# Patient Record
Sex: FEMALE | Race: WHITE | Employment: OTHER | URBAN - METROPOLITAN AREA
[De-identification: names, ages, dates, MRNs, and addresses within clinical notes are randomized per-mention and may not be internally consistent; named-entity substitution may affect disease eponyms.]

---

## 2020-05-22 ENCOUNTER — HOSPITAL ENCOUNTER (INPATIENT)
Age: 85
LOS: 6 days | Discharge: NURSING, REHAB OR LONG-TERM CARE | DRG: 291 | End: 2020-05-28
Attending: EMERGENCY MEDICINE | Admitting: INTERNAL MEDICINE
Payer: MEDICARE

## 2020-05-22 ENCOUNTER — APPOINTMENT (OUTPATIENT)
Dept: GENERAL RADIOLOGY | Age: 85
DRG: 291 | End: 2020-05-22
Payer: MEDICARE

## 2020-05-22 PROBLEM — J96.00 ACUTE RESPIRATORY FAILURE (HCC): Status: ACTIVE | Noted: 2020-05-22

## 2020-05-22 LAB
ANION GAP SERPL CALCULATED.3IONS-SCNC: 14 MMOL/L (ref 3–16)
BACTERIA: ABNORMAL /HPF
BASE EXCESS VENOUS: 0.8 MMOL/L (ref -2–3)
BASOPHILS ABSOLUTE: 0.1 K/UL (ref 0–0.2)
BASOPHILS RELATIVE PERCENT: 0.4 %
BILIRUBIN URINE: ABNORMAL
BLOOD, URINE: NEGATIVE
BUN BLDV-MCNC: 34 MG/DL (ref 7–20)
CALCIUM SERPL-MCNC: 9 MG/DL (ref 8.3–10.6)
CARBOXYHEMOGLOBIN: 1.4 % (ref 0–1.5)
CHLORIDE BLD-SCNC: 94 MMOL/L (ref 99–110)
CLARITY: ABNORMAL
CO2: 26 MMOL/L (ref 21–32)
COLOR: YELLOW
CREAT SERPL-MCNC: 0.8 MG/DL (ref 0.6–1.2)
EKG ATRIAL RATE: 55 BPM
EKG DIAGNOSIS: NORMAL
EKG Q-T INTERVAL: 256 MS
EKG QRS DURATION: 96 MS
EKG QTC CALCULATION (BAZETT): 397 MS
EKG R AXIS: 132 DEGREES
EKG T AXIS: 237 DEGREES
EKG VENTRICULAR RATE: 145 BPM
EOSINOPHILS ABSOLUTE: 0.4 K/UL (ref 0–0.6)
EOSINOPHILS RELATIVE PERCENT: 2.4 %
EPITHELIAL CELLS, UA: ABNORMAL /HPF (ref 0–5)
GFR AFRICAN AMERICAN: >60
GFR NON-AFRICAN AMERICAN: >60
GLUCOSE BLD-MCNC: 177 MG/DL (ref 70–99)
GLUCOSE URINE: NEGATIVE MG/DL
HCO3 VENOUS: 28.3 MMOL/L (ref 24–28)
HCT VFR BLD CALC: 36.9 % (ref 36–48)
HEMOGLOBIN, VEN, REDUCED: 52.9 %
HEMOGLOBIN: 11.8 G/DL (ref 12–16)
KETONES, URINE: ABNORMAL MG/DL
LACTIC ACID: 2.9 MMOL/L (ref 0.4–2)
LEUKOCYTE ESTERASE, URINE: NEGATIVE
LYMPHOCYTES ABSOLUTE: 0.7 K/UL (ref 1–5.1)
LYMPHOCYTES RELATIVE PERCENT: 4.2 %
MAGNESIUM: 2 MG/DL (ref 1.8–2.4)
MCH RBC QN AUTO: 26.8 PG (ref 26–34)
MCHC RBC AUTO-ENTMCNC: 32 G/DL (ref 31–36)
MCV RBC AUTO: 83.9 FL (ref 80–100)
METHEMOGLOBIN VENOUS: 0.5 % (ref 0–1.5)
MICROSCOPIC EXAMINATION: YES
MONOCYTES ABSOLUTE: 0.8 K/UL (ref 0–1.3)
MONOCYTES RELATIVE PERCENT: 4.7 %
MUCUS: ABNORMAL /LPF
NEUTROPHILS ABSOLUTE: 14.2 K/UL (ref 1.7–7.7)
NEUTROPHILS RELATIVE PERCENT: 88.3 %
NITRITE, URINE: NEGATIVE
O2 SAT, VEN: 46 %
PCO2, VEN: 62.3 MMHG (ref 41–51)
PDW BLD-RTO: 15.4 % (ref 12.4–15.4)
PH UA: 6 (ref 5–8)
PH VENOUS: 7.28 (ref 7.35–7.45)
PHOSPHORUS: 3.3 MG/DL (ref 2.5–4.9)
PLATELET # BLD: 221 K/UL (ref 135–450)
PMV BLD AUTO: 8.1 FL (ref 5–10.5)
PO2, VEN: 32.4 MMHG (ref 25–40)
POTASSIUM SERPL-SCNC: 4 MMOL/L (ref 3.5–5.1)
PRO-BNP: 8364 PG/ML (ref 0–449)
PROTEIN UA: 100 MG/DL
RBC # BLD: 4.4 M/UL (ref 4–5.2)
RBC UA: ABNORMAL /HPF (ref 0–4)
SARS-COV-2, PCR: NOT DETECTED
SODIUM BLD-SCNC: 134 MMOL/L (ref 136–145)
SPECIFIC GRAVITY UA: >=1.03 (ref 1–1.03)
TCO2 CALC VENOUS: 30 MMOL/L
TROPONIN: 0.02 NG/ML
URINE TYPE: ABNORMAL
UROBILINOGEN, URINE: 0.2 E.U./DL
WBC # BLD: 16 K/UL (ref 4–11)
WBC UA: ABNORMAL /HPF (ref 0–5)

## 2020-05-22 PROCEDURE — 84484 ASSAY OF TROPONIN QUANT: CPT

## 2020-05-22 PROCEDURE — 6360000002 HC RX W HCPCS: Performed by: INTERNAL MEDICINE

## 2020-05-22 PROCEDURE — 96375 TX/PRO/DX INJ NEW DRUG ADDON: CPT

## 2020-05-22 PROCEDURE — 83880 ASSAY OF NATRIURETIC PEPTIDE: CPT

## 2020-05-22 PROCEDURE — 1200000000 HC SEMI PRIVATE

## 2020-05-22 PROCEDURE — 94761 N-INVAS EAR/PLS OXIMETRY MLT: CPT

## 2020-05-22 PROCEDURE — 84100 ASSAY OF PHOSPHORUS: CPT

## 2020-05-22 PROCEDURE — 2500000003 HC RX 250 WO HCPCS: Performed by: EMERGENCY MEDICINE

## 2020-05-22 PROCEDURE — 87040 BLOOD CULTURE FOR BACTERIA: CPT

## 2020-05-22 PROCEDURE — U0003 INFECTIOUS AGENT DETECTION BY NUCLEIC ACID (DNA OR RNA); SEVERE ACUTE RESPIRATORY SYNDROME CORONAVIRUS 2 (SARS-COV-2) (CORONAVIRUS DISEASE [COVID-19]), AMPLIFIED PROBE TECHNIQUE, MAKING USE OF HIGH THROUGHPUT TECHNOLOGIES AS DESCRIBED BY CMS-2020-01-R: HCPCS

## 2020-05-22 PROCEDURE — 96374 THER/PROPH/DIAG INJ IV PUSH: CPT

## 2020-05-22 PROCEDURE — 6370000000 HC RX 637 (ALT 250 FOR IP): Performed by: EMERGENCY MEDICINE

## 2020-05-22 PROCEDURE — 99285 EMERGENCY DEPT VISIT HI MDM: CPT

## 2020-05-22 PROCEDURE — 2580000003 HC RX 258: Performed by: INTERNAL MEDICINE

## 2020-05-22 PROCEDURE — 6370000000 HC RX 637 (ALT 250 FOR IP): Performed by: INTERNAL MEDICINE

## 2020-05-22 PROCEDURE — 93005 ELECTROCARDIOGRAM TRACING: CPT | Performed by: EMERGENCY MEDICINE

## 2020-05-22 PROCEDURE — 2580000003 HC RX 258: Performed by: EMERGENCY MEDICINE

## 2020-05-22 PROCEDURE — 2580000003 HC RX 258

## 2020-05-22 PROCEDURE — 80048 BASIC METABOLIC PNL TOTAL CA: CPT

## 2020-05-22 PROCEDURE — 83605 ASSAY OF LACTIC ACID: CPT

## 2020-05-22 PROCEDURE — 6360000002 HC RX W HCPCS: Performed by: EMERGENCY MEDICINE

## 2020-05-22 PROCEDURE — 71045 X-RAY EXAM CHEST 1 VIEW: CPT

## 2020-05-22 PROCEDURE — 81001 URINALYSIS AUTO W/SCOPE: CPT

## 2020-05-22 PROCEDURE — 2700000000 HC OXYGEN THERAPY PER DAY

## 2020-05-22 PROCEDURE — 83735 ASSAY OF MAGNESIUM: CPT

## 2020-05-22 PROCEDURE — 82803 BLOOD GASES ANY COMBINATION: CPT

## 2020-05-22 PROCEDURE — 51701 INSERT BLADDER CATHETER: CPT

## 2020-05-22 PROCEDURE — 99999 PR OFFICE/OUTPT VISIT,PROCEDURE ONLY: CPT | Performed by: INTERNAL MEDICINE

## 2020-05-22 PROCEDURE — 85025 COMPLETE CBC W/AUTO DIFF WBC: CPT

## 2020-05-22 PROCEDURE — 94640 AIRWAY INHALATION TREATMENT: CPT

## 2020-05-22 RX ORDER — POLYETHYLENE GLYCOL 3350 17 G/17G
17 POWDER, FOR SOLUTION ORAL DAILY PRN
COMMUNITY

## 2020-05-22 RX ORDER — 0.9 % SODIUM CHLORIDE 0.9 %
500 INTRAVENOUS SOLUTION INTRAVENOUS ONCE
Status: COMPLETED | OUTPATIENT
Start: 2020-05-22 | End: 2020-05-22

## 2020-05-22 RX ORDER — QUETIAPINE FUMARATE 50 MG/1
50 TABLET, FILM COATED ORAL EVERY EVENING
COMMUNITY

## 2020-05-22 RX ORDER — HYDROCHLOROTHIAZIDE 12.5 MG/1
12.5 CAPSULE, GELATIN COATED ORAL EVERY MORNING
Status: DISCONTINUED | OUTPATIENT
Start: 2020-05-22 | End: 2020-05-22

## 2020-05-22 RX ORDER — QUETIAPINE FUMARATE 25 MG/1
50 TABLET, FILM COATED ORAL EVERY EVENING
Status: DISCONTINUED | OUTPATIENT
Start: 2020-05-22 | End: 2020-05-28 | Stop reason: HOSPADM

## 2020-05-22 RX ORDER — IPRATROPIUM BROMIDE AND ALBUTEROL SULFATE 2.5; .5 MG/3ML; MG/3ML
1 SOLUTION RESPIRATORY (INHALATION) EVERY 4 HOURS PRN
COMMUNITY

## 2020-05-22 RX ORDER — RIVASTIGMINE TARTRATE 1.5 MG/1
1.5 CAPSULE ORAL 2 TIMES DAILY
COMMUNITY

## 2020-05-22 RX ORDER — SERTRALINE HYDROCHLORIDE 25 MG/1
25 TABLET, FILM COATED ORAL DAILY
Status: DISCONTINUED | OUTPATIENT
Start: 2020-05-22 | End: 2020-05-28 | Stop reason: HOSPADM

## 2020-05-22 RX ORDER — UREA 10 %
6 LOTION (ML) TOPICAL NIGHTLY PRN
Status: DISCONTINUED | OUTPATIENT
Start: 2020-05-22 | End: 2020-05-24

## 2020-05-22 RX ORDER — DIGOXIN 0.25 MG/ML
250 INJECTION INTRAMUSCULAR; INTRAVENOUS ONCE
Status: COMPLETED | OUTPATIENT
Start: 2020-05-22 | End: 2020-05-22

## 2020-05-22 RX ORDER — PROMETHAZINE HYDROCHLORIDE 25 MG/1
12.5 TABLET ORAL EVERY 6 HOURS PRN
Status: DISCONTINUED | OUTPATIENT
Start: 2020-05-22 | End: 2020-05-28 | Stop reason: HOSPADM

## 2020-05-22 RX ORDER — ACETAMINOPHEN 325 MG/1
650 TABLET ORAL EVERY 6 HOURS PRN
Status: DISCONTINUED | OUTPATIENT
Start: 2020-05-22 | End: 2020-05-28 | Stop reason: HOSPADM

## 2020-05-22 RX ORDER — ACETAMINOPHEN 325 MG/1
650 TABLET ORAL EVERY 6 HOURS PRN
COMMUNITY

## 2020-05-22 RX ORDER — PREDNISONE 1 MG/1
2.5 TABLET ORAL DAILY
Status: DISCONTINUED | OUTPATIENT
Start: 2020-05-22 | End: 2020-05-28 | Stop reason: HOSPADM

## 2020-05-22 RX ORDER — DOCUSATE SODIUM 100 MG/1
100 CAPSULE, LIQUID FILLED ORAL EVERY MORNING
Status: DISCONTINUED | OUTPATIENT
Start: 2020-05-22 | End: 2020-05-28 | Stop reason: HOSPADM

## 2020-05-22 RX ORDER — HYDROCODONE BITARTRATE AND ACETAMINOPHEN 5; 325 MG/1; MG/1
1 TABLET ORAL EVERY 8 HOURS PRN
Status: DISCONTINUED | OUTPATIENT
Start: 2020-05-22 | End: 2020-05-28 | Stop reason: HOSPADM

## 2020-05-22 RX ORDER — POTASSIUM CHLORIDE 600 MG/1
8 TABLET, FILM COATED, EXTENDED RELEASE ORAL 2 TIMES DAILY
COMMUNITY

## 2020-05-22 RX ORDER — LANOLIN ALCOHOL/MO/W.PET/CERES
6 CREAM (GRAM) TOPICAL NIGHTLY PRN
COMMUNITY

## 2020-05-22 RX ORDER — RIVASTIGMINE TARTRATE 1.5 MG/1
1.5 CAPSULE ORAL 2 TIMES DAILY
Status: DISCONTINUED | OUTPATIENT
Start: 2020-05-22 | End: 2020-05-28 | Stop reason: HOSPADM

## 2020-05-22 RX ORDER — FLECAINIDE ACETATE 100 MG/1
100 TABLET ORAL 2 TIMES DAILY
Status: DISCONTINUED | OUTPATIENT
Start: 2020-05-22 | End: 2020-05-28 | Stop reason: HOSPADM

## 2020-05-22 RX ORDER — SODIUM CHLORIDE 9 MG/ML
INJECTION, SOLUTION INTRAVENOUS
Status: COMPLETED
Start: 2020-05-22 | End: 2020-05-22

## 2020-05-22 RX ORDER — DOCUSATE SODIUM 100 MG/1
100 CAPSULE, LIQUID FILLED ORAL EVERY MORNING
COMMUNITY

## 2020-05-22 RX ORDER — HYDROCHLOROTHIAZIDE 12.5 MG/1
12.5 CAPSULE, GELATIN COATED ORAL EVERY MORNING
Status: ON HOLD | COMMUNITY
End: 2020-05-28 | Stop reason: HOSPADM

## 2020-05-22 RX ORDER — SODIUM CHLORIDE 0.9 % (FLUSH) 0.9 %
10 SYRINGE (ML) INJECTION PRN
Status: DISCONTINUED | OUTPATIENT
Start: 2020-05-22 | End: 2020-05-28 | Stop reason: HOSPADM

## 2020-05-22 RX ORDER — BRIMONIDINE TARTRATE 2 MG/ML
1 SOLUTION/ DROPS OPHTHALMIC 2 TIMES DAILY
Status: DISCONTINUED | OUTPATIENT
Start: 2020-05-22 | End: 2020-05-28 | Stop reason: HOSPADM

## 2020-05-22 RX ORDER — ONDANSETRON 4 MG/1
4 TABLET, FILM COATED ORAL EVERY 6 HOURS PRN
COMMUNITY

## 2020-05-22 RX ORDER — ONDANSETRON 2 MG/ML
4 INJECTION INTRAMUSCULAR; INTRAVENOUS EVERY 6 HOURS PRN
Status: DISCONTINUED | OUTPATIENT
Start: 2020-05-22 | End: 2020-05-28 | Stop reason: HOSPADM

## 2020-05-22 RX ORDER — METOPROLOL TARTRATE 5 MG/5ML
5 INJECTION INTRAVENOUS ONCE
Status: COMPLETED | OUTPATIENT
Start: 2020-05-22 | End: 2020-05-22

## 2020-05-22 RX ORDER — POLYETHYLENE GLYCOL 3350 17 G/17G
17 POWDER, FOR SOLUTION ORAL DAILY PRN
Status: DISCONTINUED | OUTPATIENT
Start: 2020-05-22 | End: 2020-05-28 | Stop reason: HOSPADM

## 2020-05-22 RX ORDER — ACETAMINOPHEN 650 MG/1
650 SUPPOSITORY RECTAL ONCE
Status: COMPLETED | OUTPATIENT
Start: 2020-05-22 | End: 2020-05-22

## 2020-05-22 RX ORDER — FUROSEMIDE 10 MG/ML
40 INJECTION INTRAMUSCULAR; INTRAVENOUS ONCE
Status: DISCONTINUED | OUTPATIENT
Start: 2020-05-22 | End: 2020-05-25 | Stop reason: SDUPTHER

## 2020-05-22 RX ORDER — ACETAMINOPHEN 650 MG/1
650 SUPPOSITORY RECTAL EVERY 6 HOURS PRN
Status: DISCONTINUED | OUTPATIENT
Start: 2020-05-22 | End: 2020-05-28 | Stop reason: HOSPADM

## 2020-05-22 RX ORDER — METOPROLOL SUCCINATE 25 MG/1
25 TABLET, EXTENDED RELEASE ORAL DAILY
Status: DISCONTINUED | OUTPATIENT
Start: 2020-05-22 | End: 2020-05-23

## 2020-05-22 RX ORDER — IPRATROPIUM BROMIDE AND ALBUTEROL SULFATE 2.5; .5 MG/3ML; MG/3ML
1 SOLUTION RESPIRATORY (INHALATION) EVERY 4 HOURS PRN
Status: DISCONTINUED | OUTPATIENT
Start: 2020-05-22 | End: 2020-05-28 | Stop reason: HOSPADM

## 2020-05-22 RX ORDER — SODIUM CHLORIDE 0.9 % (FLUSH) 0.9 %
10 SYRINGE (ML) INJECTION EVERY 12 HOURS SCHEDULED
Status: DISCONTINUED | OUTPATIENT
Start: 2020-05-22 | End: 2020-05-28 | Stop reason: HOSPADM

## 2020-05-22 RX ORDER — IPRATROPIUM BROMIDE AND ALBUTEROL SULFATE 2.5; .5 MG/3ML; MG/3ML
1 SOLUTION RESPIRATORY (INHALATION) ONCE
Status: COMPLETED | OUTPATIENT
Start: 2020-05-22 | End: 2020-05-22

## 2020-05-22 RX ORDER — BRIMONIDINE TARTRATE 2 MG/ML
1 SOLUTION/ DROPS OPHTHALMIC 2 TIMES DAILY
COMMUNITY

## 2020-05-22 RX ORDER — BUDESONIDE 3 MG/1
3 CAPSULE, COATED PELLETS ORAL EVERY MORNING
Status: DISCONTINUED | OUTPATIENT
Start: 2020-05-22 | End: 2020-05-28 | Stop reason: HOSPADM

## 2020-05-22 RX ADMIN — RIVASTIGMINE TARTRATE 1.5 MG: 1.5 CAPSULE ORAL at 23:22

## 2020-05-22 RX ADMIN — FLECAINIDE ACETATE 100 MG: 100 TABLET ORAL at 23:20

## 2020-05-22 RX ADMIN — DIGOXIN 250 MCG: 250 INJECTION, SOLUTION INTRAMUSCULAR; INTRAVENOUS; PARENTERAL at 17:38

## 2020-05-22 RX ADMIN — SODIUM CHLORIDE 500 ML: 9 INJECTION, SOLUTION INTRAVENOUS at 06:59

## 2020-05-22 RX ADMIN — Medication 10 ML: at 23:23

## 2020-05-22 RX ADMIN — Medication 500 ML: at 06:01

## 2020-05-22 RX ADMIN — ACETAMINOPHEN 650 MG: 650 SUPPOSITORY RECTAL at 05:53

## 2020-05-22 RX ADMIN — IPRATROPIUM BROMIDE AND ALBUTEROL SULFATE 1 AMPULE: .5; 3 SOLUTION RESPIRATORY (INHALATION) at 04:53

## 2020-05-22 RX ADMIN — DIGOXIN 250 MCG: 0.25 INJECTION INTRAMUSCULAR; INTRAVENOUS at 06:04

## 2020-05-22 RX ADMIN — BRIMONIDINE TARTRATE 1 DROP: 2 SOLUTION OPHTHALMIC at 23:25

## 2020-05-22 RX ADMIN — METOPROLOL TARTRATE 5 MG: 5 INJECTION, SOLUTION INTRAVENOUS at 05:27

## 2020-05-22 RX ADMIN — SODIUM CHLORIDE 500 ML: 0.9 INJECTION, SOLUTION INTRAVENOUS at 06:01

## 2020-05-22 RX ADMIN — APIXABAN 2.5 MG: 2.5 TABLET, FILM COATED ORAL at 23:21

## 2020-05-22 ASSESSMENT — PAIN SCALES - PAIN ASSESSMENT IN ADVANCED DEMENTIA (PAINAD)
TOTALSCORE: 0
NEGVOCALIZATION: 0
BODYLANGUAGE: 0
CONSOLABILITY: 0
NEGVOCALIZATION: 0
FACIALEXPRESSION: 0
BREATHING: 0
BREATHING: 0
BODYLANGUAGE: 0
FACIALEXPRESSION: 0
NEGVOCALIZATION: 0
TOTALSCORE: 0
TOTALSCORE: 0
BODYLANGUAGE: 0
CONSOLABILITY: 0
FACIALEXPRESSION: 0
CONSOLABILITY: 0
BREATHING: 0

## 2020-05-22 ASSESSMENT — PAIN SCALES - GENERAL
PAINLEVEL_OUTOF10: 0

## 2020-05-22 NOTE — ED PROVIDER NOTES
(L) 1.0 - 5.1 K/uL    Monocytes Absolute 0.8 0.0 - 1.3 K/uL    Eosinophils Absolute 0.4 0.0 - 0.6 K/uL    Basophils Absolute 0.1 0.0 - 0.2 K/uL   Basic Metabolic Panel (EP - 1)   Result Value Ref Range    Sodium 134 (L) 136 - 145 mmol/L    Potassium 4.0 3.5 - 5.1 mmol/L    Chloride 94 (L) 99 - 110 mmol/L    CO2 26 21 - 32 mmol/L    Anion Gap 14 3 - 16    Glucose 177 (H) 70 - 99 mg/dL    BUN 34 (H) 7 - 20 mg/dL    CREATININE 0.8 0.6 - 1.2 mg/dL    GFR Non-African American >60 >60    GFR African American >60 >60    Calcium 9.0 8.3 - 10.6 mg/dL   Troponin   Result Value Ref Range    Troponin 0.02 (H) <0.01 ng/mL   Brain Natriuretic Peptide   Result Value Ref Range    Pro-BNP 8,364 (H) 0 - 449 pg/mL   Blood gas, venous (Lab)   Result Value Ref Range    pH, Francisco 7.279 (L) 7.350 - 7.450    pCO2, Francisco 62.3 (H) 41.0 - 51.0 mmHg    pO2, Francisco 32.4 25.0 - 40.0 mmHg    HCO3, Venous 28.3 (H) 24.0 - 28.0 mmol/L    Base Excess, Francisco 0.8 -2.0 - 3.0 mmol/L    O2 Sat, Francisco 46 Not established %    Carboxyhemoglobin 1.4 0.0 - 1.5 %    MetHgb, Francisco 0.5 0.0 - 1.5 %    TC02 (Calc), Francisco 30 mmol/L    Hemoglobin, Francisco, Reduced 52.90 %   Lactic Acid, Plasma   Result Value Ref Range    Lactic Acid 2.9 (H) 0.4 - 2.0 mmol/L   Magnesium   Result Value Ref Range    Magnesium 2.00 1.80 - 2.40 mg/dL   Phosphorus   Result Value Ref Range    Phosphorus 3.3 2.5 - 4.9 mg/dL   Urinalysis, reflex to microscopic (Lab)   Result Value Ref Range    Urine Type NotGiven    EKG 12 Lead   Result Value Ref Range    Ventricular Rate 145 BPM    Atrial Rate 55 BPM    QRS Duration 96 ms    Q-T Interval 256 ms    QTc Calculation (Bazett) 397 ms    R Axis 132 degrees    T Axis 237 degrees    Diagnosis       EKG performed in ER and to be interpreted by ER physician. Confirmed by MD, ER ([id number]),  Nir Sexton ([id number]) on 5/22/2020 5:45:59 AM     RECENT VITALS:  BP: 89/65,Temp: 100.3 °F (37.9 °C), Pulse: 109, Resp: 25, SpO2: 99 %     Procedures     N/A    ED Course     Nursing Disposition     PATIENT REFERRED TO:  No follow-up provider specified.     DISCHARGE MEDICATIONS:  New Prescriptions    No medications on file       DISPOSITION          Norma Islas MD  05/22/20 0232

## 2020-05-22 NOTE — PROGRESS NOTES
or uncooperative          b. Patients treated with HHN at Home        c. Unable to demonstrate proper use of MDI with spacer     d. RR > 30 bpm   5. Bronchodilators will be delivered via Metered Dose Inhaler (MDI), HHN, Aerogen to intubated patients on mechanical ventilation. 6. Inhalation medication orders will be delivered and/or substituted as outlined below. Aerosolized Medications Ordering and Administration Guidelines:    1. All Medications will be ordered by a physician, and their frequency and/or modality will be adjusted as defined by the patients Respiratory Severity Index (RSI) score. 2. If the patient does not have documented COPD, consider discontinuing anticholinergics when RSI is less than 9.  3. If the bronchospasm worsens (increased RSI), then the bronchodilator frequency can be increased to a maximum of every 4 hours. If greater than every 4 hours is required, the physician will be contacted. 4. If the bronchospasm improves, the frequency of the bronchodilator can be decreased, based on the patient's RSI, but not less than home treatment regimen frequency. 5. Bronchodilator(s) will be discontinued if patient has a RSI less than 9 and has received no scheduled or as needed treatment for 72  Hrs. Patients Ordered on a Mucolytic Agent:    1. Must always be administered with a bronchodilator. 2. Discontinue if patient experiences worsened bronchospasm, or secretions have lessened to the point that the patient is able to clear them with a cough. Anti-inflammatory and Combination Medications:    1. If the patient lacks prior history of lung disease, is not using inhaled anti-inflammatory medication at home, and lacks wheezing by examination or by history for at least 24 hours, contact physician for possible discontinuation.

## 2020-05-22 NOTE — CONSULTS
brimonidine (ALPHAGAN) 0.2 % ophthalmic solution Place 1 drop into both eyes 2 times daily   Yes Historical Provider, MD   vitamin D (CHOLECALCIFEROL) 25 MCG (1000 UT) TABS tablet Take 1,000 Units by mouth daily   Yes Historical Provider, MD   docusate sodium (COLACE) 100 MG capsule Take 100 mg by mouth every morning   Yes Historical Provider, MD   hydroCHLOROthiazide (MICROZIDE) 12.5 MG capsule Take 12.5 mg by mouth every morning   Yes Historical Provider, MD   ipratropium-albuterol (DUONEB) 0.5-2.5 (3) MG/3ML SOLN nebulizer solution Inhale 1 vial into the lungs every 4 hours as needed for Shortness of Breath   Yes Historical Provider, MD   linaclotide (LINZESS) 290 MCG CAPS capsule Take 290 mcg by mouth every morning (before breakfast)   Yes Historical Provider, MD   melatonin 3 MG TABS tablet Take 6 mg by mouth nightly as needed   Yes Historical Provider, MD   polyethylene glycol (MIRALAX) PACK packet Take 17 g by mouth daily as needed (constipation)   Yes Historical Provider, MD   potassium chloride (KLOR-CON) 8 MEQ extended release tablet Take 8 mEq by mouth 2 times daily   Yes Historical Provider, MD   QUEtiapine (SEROQUEL) 50 MG tablet Take 50 mg by mouth every evening   Yes Historical Provider, MD   rivastigmine (EXELON) 1.5 MG capsule Take 1.5 mg by mouth 2 times daily   Yes Historical Provider, MD   acetaminophen (TYLENOL) 325 MG tablet Take 650 mg by mouth every 6 hours as needed for Pain   Yes Historical Provider, MD   ondansetron (ZOFRAN) 4 MG tablet Take 4 mg by mouth every 6 hours as needed for Nausea or Vomiting   Yes Historical Provider, MD   acetaminophen-codeine (TYLENOL #3) 300-30 MG per tablet Take 1 tablet by mouth every 6 hours as needed (headache). Indications: Headache    Yes Historical Provider, MD   HYDROcodone-acetaminophen (NORCO) 5-325 MG per tablet Take 1 tablet by mouth every 8 hours as needed for Pain.  Alternate with Tylenol #3   Yes Historical Provider, MD   sertraline (ZOLOFT) 25

## 2020-05-22 NOTE — ED TRIAGE NOTES
Pt transfer from [address] [place] [address] with sob that started earlier today. Pt was found to have o2 sat in 70's and blood pressure in 90's. Pt transported by ems on non rebreather 15L o2 sat 100%. Pt has labored breathing. ekg and iv access obtained. Pt placed on high flow nasal canula at 7L. Family in consult room, pt dnr.

## 2020-05-22 NOTE — CARE COORDINATION
Case Management Assessment           Initial Evaluation                Date / Time of Evaluation: 5/22/2020 5:50 PM                 Assessment Completed by: Heather James    Patient Name: Justin Jasso     YOB: 1925  Diagnosis: Acute respiratory failure ([facility] [address] [J96.00]  Acute respiratory failure ([facility] [address] [J96.00]     Date / Time: 5/22/2020  4:36 AM    Patient Admission Status: Inpatient    If patient is discharged prior to next notation, then this note serves as note for discharge by case management. Current PCP: Tenzin Jarvis MD  Clinic Patient: No    Chart Reviewed: Yes  Patient/ Family Interviewed: Yes    Initial assessment completed at bedside with: daughter, DIANN    Hospitalization in the last 30 days: No    Emergency Contacts:  Extended Emergency Contact Information  Primary Emergency Contact: [address] Phone: [phone]  Relation: Child  Secondary Emergency Contact: [address] Phone: [phone]  Relation: Child    Advance Directives:   Code Status: DNR-CCA    Financial  Payor: Nicky Yadav / Plan: Brant Maciel PPO / Product Type: Medicare /     Pre-cert required for SNF: Yes    Pharmacy    [facility] [address] [phone] [place] [phone]  [facility] [id number]  Phone: [phone] Fax: [phone]      Potential assistance Purchasing Medications:    Does Patient want to participate in local refill/ meds to beds program?:      Meds To Beds General Rules:  1. Can ONLY be done Monday- Friday between 8:30am-5pm  2. Prescription(s) must be in pharmacy by 3pm to be filled same day  3. Copy of patient's insurance/ prescription drug card and patient face sheet must be sent along with the prescription(s)  4. Cost of Rx cannot be added to hospital bill. If financial assistance is needed, please contact unit  or ;  Case

## 2020-05-22 NOTE — PROGRESS NOTES
Unable to obtain labs at this time. Pt continues to kick staff when touch, screaming \"help\". VS as charted.  Aileen Obrien

## 2020-05-22 NOTE — H&P
Hospital Medicine History & Physical      PCP: Lanre Choudhury MD    Date of Admission: 2020    Date of Service: Pt seen/examined on 2020    Chief Complaint:      Chief Complaint   Patient presents with    Respiratory Distress       History Of Present Illness: The patient is a 17-year-old female with past medical history of dementia, atrial fibrillation, hypertension presented to the hospital from a nursing facility due to shortness of breath and lethargy. Patient is unable to provide any significant history so most of the history was obtained from chart review. According to the ER documentation patient was having increased work of breathing and was noted to be lethargic for more than 24 hours. Her oxygen saturation were found to be in the low 70s and she was placed on 5 L of nasal saline which brought it up to the 80s so EMS was called. Patient was then brought to the hospital.  On arrival to the hospital patient was noted to be atrial fibrillation with RVR. Her proBNP was elevated greater than 8000. Patient was given IV metoprolol for heart rate control. Patient was admitted to the hospital for further management. Past Medical History:        Diagnosis Date    Atrial fibrillation ([address]     Colitis     Glaucoma     Hypertension        Past Surgical History:        Procedure Laterality Date    APPENDECTOMY      BACK SURGERY      CATARACT REMOVAL       SECTION      CHOLECYSTECTOMY      JOINT REPLACEMENT      RECTAL SURGERY      TONSILLECTOMY         Medications Prior to Admission:    Prior to Admission medications    Medication Sig Start Date End Date Taking?  Authorizing Provider   losartan-hydrochlorothiazide (HYZAAR) 100-12.5 MG per tablet Take 1 tablet by mouth daily    Historical Provider, MD   acetaminophen-codeine (TYLENOL #3) 300-30 MG per tablet Take 1 tablet by mouth every 4 hours as needed for Pain    Historical Provider, MD   HYDROcodone-acetaminophen (NORCO) 5-325 MG per tablet Take 1 tablet by mouth every 6 hours as needed for Pain . Historical Provider, MD   zolpidem (AMBIEN) 10 MG tablet Take by mouth nightly as needed for Sleep    Historical Provider, MD   sertraline (ZOLOFT) 25 MG tablet Take 25 mg by mouth daily    Historical Provider, MD   predniSONE (DELTASONE) 1 MG tablet Take 1 mg by mouth daily    Historical Provider, MD   ibuprofen (ADVIL;MOTRIN) 200 MG tablet Take 2 tablets by mouth every 8 hours as needed for Pain 5/2/17   Kiran Lozano MD   azithromycin (ZITHROMAX) 250 MG tablet Dispense one 5 day supply pack and take as directed 6/30/15   Rubi Lam MD   fluticasone [facility]) 50 MCG/ACT nasal spray 2 sprays by Nasal route daily 6/30/15   Rubi Lam MD   fluconazole (DIFLUCAN) 100 MG tablet Take 1 tablet by mouth daily. 9/27/14   Rubi Lam MD   losartan (COZAAR) 25 MG tablet Take 20 mg by mouth daily. Historical Provider, MD   budesonide (ENTOCORT EC) 3 MG capsule Take 6 mg by mouth every morning. Historical Provider, MD   amLODIPine (NORVASC) 5 MG tablet Take 5 mg by mouth daily. Historical Provider, MD   metoprolol (TOPROL-XL) 50 MG XL tablet Take 50 mg by mouth daily. Historical Provider, MD   flecainide (TAMBOCOR) 100 MG tablet Take 100 mg by mouth 2 times daily. Historical Provider, MD   traMADol (ULTRAM) 50 MG tablet Take 50 mg by mouth 2 times daily as needed. Historical Provider, MD   brimonidine-timolol (COMBIGAN) 0.2-0.5 % ophthalmic solution Place 1 drop into the left eye every 12 hours. Historical Provider, MD   dorzolamide-timolol (COSOPT) 22.3-6.8 MG/ML ophthalmic solution Place 1 drop into the right eye 2 times daily. Historical Provider, MD   citalopram (CELEXA) 20 MG tablet Take 40 mg by mouth daily. Historical Provider, MD   travoprost, benzalkonium, (TRAVATAN) 0.004 % ophthalmic solution Place 1 drop into both eyes nightly.     Historical Provider, MD       Allergies: plan:    ASSESSMENT/PLAN:  Active Hospital Problems    Diagnosis Date Noted    Acute respiratory failure ([facility] [address] [J96.00] 05/22/2020     Assessment  Acute hypoxic respiratory failure with concern for heart failure  Atrial fibrillation with RVR  Suspected COVID-19 infection  Generalized weakness  Dementia    Plan:  -Continue oxygen, wean as tolerated  -Cardiology has been consulted, await recommendations  -Continue gentle diuresis with Lasix  -Await COVID-19 test results  -Echo ordered  - Continue flecainide, Eliquis, Toprol  - Continue rest of home medications  -Speech eval    DVT Prophylaxis: Lovenox  Diet: Diet NPO Effective Now  Code Status: DNR-CCA  PT/OT Eval Status: Pending    Dispo -pending clinical course       Kalani Ramirez MD  The note was completed using EMR. Every effort was made to ensure accuracy; however, inadvertent computerized transcription errors may be present. Thank you Tenzin Jarvis MD for the opportunity to be involved in this patient's care. If you have any questions or concerns please feel free to contact me at [phone].

## 2020-05-22 NOTE — PROGRESS NOTES
Spoke with daughter, Jay Mendosa, and updated on her status. All questions/concerns answered and contact numbers provided for future questions.  Talon Castorena

## 2020-05-23 LAB
ANION GAP SERPL CALCULATED.3IONS-SCNC: 15 MMOL/L (ref 3–16)
BUN BLDV-MCNC: 30 MG/DL (ref 7–20)
CALCIUM SERPL-MCNC: 8.9 MG/DL (ref 8.3–10.6)
CHLORIDE BLD-SCNC: 100 MMOL/L (ref 99–110)
CO2: 24 MMOL/L (ref 21–32)
CREAT SERPL-MCNC: <0.5 MG/DL (ref 0.6–1.2)
GFR AFRICAN AMERICAN: >60
GFR NON-AFRICAN AMERICAN: >60
GLUCOSE BLD-MCNC: 77 MG/DL (ref 70–99)
HCT VFR BLD CALC: 34.5 % (ref 36–48)
HEMOGLOBIN: 10.9 G/DL (ref 12–16)
MCH RBC QN AUTO: 26.4 PG (ref 26–34)
MCHC RBC AUTO-ENTMCNC: 31.6 G/DL (ref 31–36)
MCV RBC AUTO: 83.6 FL (ref 80–100)
PDW BLD-RTO: 15.3 % (ref 12.4–15.4)
PLATELET # BLD: 223 K/UL (ref 135–450)
PMV BLD AUTO: 8 FL (ref 5–10.5)
POTASSIUM REFLEX MAGNESIUM: 3.6 MMOL/L (ref 3.5–5.1)
RBC # BLD: 4.13 M/UL (ref 4–5.2)
SODIUM BLD-SCNC: 139 MMOL/L (ref 136–145)
WBC # BLD: 7.3 K/UL (ref 4–11)

## 2020-05-23 PROCEDURE — 2580000003 HC RX 258: Performed by: INTERNAL MEDICINE

## 2020-05-23 PROCEDURE — 92610 EVALUATE SWALLOWING FUNCTION: CPT

## 2020-05-23 PROCEDURE — 99232 SBSQ HOSP IP/OBS MODERATE 35: CPT | Performed by: INTERNAL MEDICINE

## 2020-05-23 PROCEDURE — 80048 BASIC METABOLIC PNL TOTAL CA: CPT

## 2020-05-23 PROCEDURE — 2500000003 HC RX 250 WO HCPCS: Performed by: INTERNAL MEDICINE

## 2020-05-23 PROCEDURE — 85027 COMPLETE CBC AUTOMATED: CPT

## 2020-05-23 PROCEDURE — 6360000002 HC RX W HCPCS: Performed by: INTERNAL MEDICINE

## 2020-05-23 PROCEDURE — 87040 BLOOD CULTURE FOR BACTERIA: CPT

## 2020-05-23 PROCEDURE — 1200000000 HC SEMI PRIVATE

## 2020-05-23 PROCEDURE — 6370000000 HC RX 637 (ALT 250 FOR IP): Performed by: INTERNAL MEDICINE

## 2020-05-23 RX ORDER — METOPROLOL TARTRATE 5 MG/5ML
2.5 INJECTION INTRAVENOUS ONCE
Status: COMPLETED | OUTPATIENT
Start: 2020-05-23 | End: 2020-05-23

## 2020-05-23 RX ORDER — POTASSIUM CHLORIDE 600 MG/1
8 TABLET, FILM COATED, EXTENDED RELEASE ORAL 2 TIMES DAILY
Status: COMPLETED | OUTPATIENT
Start: 2020-05-23 | End: 2020-05-24

## 2020-05-23 RX ORDER — METOPROLOL SUCCINATE 25 MG/1
25 TABLET, EXTENDED RELEASE ORAL 2 TIMES DAILY
Status: DISCONTINUED | OUTPATIENT
Start: 2020-05-23 | End: 2020-05-24

## 2020-05-23 RX ORDER — FUROSEMIDE 10 MG/ML
20 INJECTION INTRAMUSCULAR; INTRAVENOUS ONCE
Status: COMPLETED | OUTPATIENT
Start: 2020-05-23 | End: 2020-05-23

## 2020-05-23 RX ORDER — LABETALOL 20 MG/4 ML (5 MG/ML) INTRAVENOUS SYRINGE
10 EVERY 4 HOURS PRN
Status: DISCONTINUED | OUTPATIENT
Start: 2020-05-23 | End: 2020-05-28 | Stop reason: HOSPADM

## 2020-05-23 RX ADMIN — QUETIAPINE FUMARATE 50 MG: 25 TABLET ORAL at 19:02

## 2020-05-23 RX ADMIN — FUROSEMIDE 20 MG: 10 INJECTION, SOLUTION INTRAMUSCULAR; INTRAVENOUS at 16:21

## 2020-05-23 RX ADMIN — Medication 10 ML: at 22:05

## 2020-05-23 RX ADMIN — METOPROLOL TARTRATE 2.5 MG: 5 INJECTION INTRAVENOUS at 11:32

## 2020-05-23 RX ADMIN — BUDESONIDE 3 MG: 3 CAPSULE, GELATIN COATED ORAL at 09:55

## 2020-05-23 RX ADMIN — SERTRALINE HYDROCHLORIDE 25 MG: 25 TABLET ORAL at 09:54

## 2020-05-23 RX ADMIN — APIXABAN 2.5 MG: 2.5 TABLET, FILM COATED ORAL at 22:04

## 2020-05-23 RX ADMIN — APIXABAN 2.5 MG: 2.5 TABLET, FILM COATED ORAL at 09:54

## 2020-05-23 RX ADMIN — FLECAINIDE ACETATE 100 MG: 100 TABLET ORAL at 22:04

## 2020-05-23 RX ADMIN — METOPROLOL SUCCINATE 25 MG: 25 TABLET, EXTENDED RELEASE ORAL at 07:01

## 2020-05-23 RX ADMIN — FLECAINIDE ACETATE 100 MG: 100 TABLET ORAL at 09:54

## 2020-05-23 RX ADMIN — PREDNISONE 2.5 MG: 5 TABLET ORAL at 09:54

## 2020-05-23 RX ADMIN — RIVASTIGMINE TARTRATE 1.5 MG: 1.5 CAPSULE ORAL at 22:03

## 2020-05-23 RX ADMIN — BRIMONIDINE TARTRATE 1 DROP: 2 SOLUTION OPHTHALMIC at 09:54

## 2020-05-23 RX ADMIN — RIVASTIGMINE TARTRATE 1.5 MG: 1.5 CAPSULE ORAL at 09:55

## 2020-05-23 RX ADMIN — BRIMONIDINE TARTRATE 1 DROP: 2 SOLUTION OPHTHALMIC at 22:04

## 2020-05-23 RX ADMIN — Medication 10 ML: at 09:58

## 2020-05-23 RX ADMIN — POTASSIUM CHLORIDE 8 MEQ: 600 TABLET, FILM COATED, EXTENDED RELEASE ORAL at 16:22

## 2020-05-23 RX ADMIN — DOCUSATE SODIUM 100 MG: 100 CAPSULE, LIQUID FILLED ORAL at 09:54

## 2020-05-23 RX ADMIN — METOPROLOL SUCCINATE 25 MG: 25 TABLET, FILM COATED, EXTENDED RELEASE ORAL at 16:59

## 2020-05-23 RX ADMIN — POTASSIUM CHLORIDE 8 MEQ: 600 TABLET, FILM COATED, EXTENDED RELEASE ORAL at 22:03

## 2020-05-23 ASSESSMENT — PAIN SCALES - PAIN ASSESSMENT IN ADVANCED DEMENTIA (PAINAD)
BODYLANGUAGE: 0
NEGVOCALIZATION: 0
FACIALEXPRESSION: 0
BREATHING: 0
TOTALSCORE: 0
BODYLANGUAGE: 0
CONSOLABILITY: 0
CONSOLABILITY: 0
NEGVOCALIZATION: 0
TOTALSCORE: 0
BODYLANGUAGE: 0
CONSOLABILITY: 0
BODYLANGUAGE: 0
BREATHING: 0
BODYLANGUAGE: 0
TOTALSCORE: 0
BREATHING: 0
FACIALEXPRESSION: 0
CONSOLABILITY: 0
CONSOLABILITY: 0
TOTALSCORE: 0
BODYLANGUAGE: 0
NEGVOCALIZATION: 0
BODYLANGUAGE: 0
FACIALEXPRESSION: 0
NEGVOCALIZATION: 0
BREATHING: 0
TOTALSCORE: 0
CONSOLABILITY: 0
BODYLANGUAGE: 0
CONSOLABILITY: 0
BREATHING: 0
CONSOLABILITY: 0
NEGVOCALIZATION: 0
NEGVOCALIZATION: 0
TOTALSCORE: 0
BREATHING: 0
CONSOLABILITY: 0
BREATHING: 0
TOTALSCORE: 0
BREATHING: 0
TOTALSCORE: 0
TOTALSCORE: 0
FACIALEXPRESSION: 0
BREATHING: 0
NEGVOCALIZATION: 0
BODYLANGUAGE: 0
FACIALEXPRESSION: 0
NEGVOCALIZATION: 0
FACIALEXPRESSION: 0
FACIALEXPRESSION: 0
NEGVOCALIZATION: 0

## 2020-05-23 NOTE — PLAN OF CARE
Problem: Falls - Risk of:  Goal: Will remain free from falls  Description: Will remain free from falls  5/23/2020 0313 by Holland Hernandes RN  Outcome: Ongoing   Fall precautions are in place. Bed alarm is on and in lowest position. Avasys monitor in place. Will continue to monitor. Problem: Restraint Use - Nonviolent/Non-Self-Destructive Behavior:  Goal: Absence of restraint indications  Description: Absence of restraint indications  5/23/2020 9611 by Holland Hernandes RN  Outcome: Ongoing   Pt has not been in restraints all shift. Problem: Breathing Pattern - Ineffective:  Goal: Ability to achieve and maintain a regular respiratory rate will improve  Description: Ability to achieve and maintain a regular respiratory rate will improve  5/23/2020 0312 by Holland Hernandes RN  Outcome: Ongoing   Pt's RR is 22bpm. SPO2 is 99% on 4L O2 via HFNC. Pt is COVID negative. Will continue to wean O2. Problem: Cardiac:  Goal: Ability to maintain an adequate cardiac output will improve  Description: Ability to maintain an adequate cardiac output will improve  Outcome: Ongoing   Pt is A fib on tele. HR 90s-120s. Will continue to monitor.

## 2020-05-23 NOTE — PLAN OF CARE
Problem: Falls - Risk of:  Goal: Will remain free from falls  Description: Will remain free from falls  5/23/2020 1708 by Marylee Cart, RN  Outcome: Met This Shift     Problem: Breathing Pattern - Ineffective:  Goal: Ability to achieve and maintain a regular respiratory rate will improve  Description: Ability to achieve and maintain a regular respiratory rate will improve  5/23/2020 1708 by Marylee Cart, RN  Outcome: Ongoing     Problem: Safety:  Goal: Free from accidental physical injury  Description: Free from accidental physical injury  Outcome: Met This Shift     Problem: Daily Care:  Goal: Daily care needs are met  Description: Daily care needs are met  Outcome: Met This Shift     Problem: Skin Integrity:  Goal: Skin integrity will stabilize  Description: Skin integrity will stabilize  Outcome: Ongoing     Problem: Confusion - Acute:  Goal: Mental status will be restored to baseline  Description: Mental status will be restored to baseline  5/23/2020 1708 by Marylee Cart, RN  Outcome: Not Met This Shift   Pt not oriented. Pt continues to have hallucinations and speak with people who aren't there.

## 2020-05-23 NOTE — PROGRESS NOTES
Speech Language Pathology  Facility/Department: [place] [address] [facility]   CLINICAL BEDSIDE SWALLOW EVALUATION    NAME: Mell Pham  : 10/18/1925  MRN: [id number]    ADMISSION DATE: 2020  ADMITTING DIAGNOSIS: has Sinusitis, acute; Allergic sinusitis; Moniliasis of mouth; Impacted cerumen; and Acute respiratory failure ([address] on their problem list.  ONSET DATE: 20    Recent Chest Xray/CT of Chest: (20)  Cardiomegaly.  Mild vascular congestion.  Possible trace left pleural    effusion. Subsegmental atelectasis in the right lower lobe. Date of Eval: 2020  Evaluating Therapist: Fidelia Hart    Current Diet level:  Current Diet : NPO  Current Liquid Diet : NPO      Primary Complaint  Patient Complaint: wants ice chips    Pain:  None indicated    Reason for Referral  Mell Pham was referred for a bedside swallow evaluation to assess the efficiency of her swallow function, identify signs and symptoms of aspiration and make recommendations regarding safe dietary consistencies, effective compensatory strategies, and safe eating environment. Impression  Dysphagia Diagnosis: Suspected needs further assessment  Dysphagia Impression : Pt presents as increased aspiration risk 2/2 respiratory status and dyspnea. Pt overtly SOB t/o session with increasing RR as PO trials progressed. Oral phase appeared [place]. Mastication of regular solid functional with adequate oral clearance noted following thin liquid rinse; however, increasing SOB noted with mastication. Pt with reactive coughing when taking large, sequential sips of thins via straw - suspect d/t reduced ability to coordinate breath / swallow. No overt signs associated with aspiration with ice chips, small sips of thins via straw (pt unable to self-administer liquids via cup edge d/t reduced UE ROM / strength), puree, or regular solids.  Given that pt on regular diet at baseline with no indication of dysphagia hx and CXR not discharged prior to next session, this note will serve as discharge summary.

## 2020-05-24 PROCEDURE — 1200000000 HC SEMI PRIVATE

## 2020-05-24 PROCEDURE — 6370000000 HC RX 637 (ALT 250 FOR IP): Performed by: INTERNAL MEDICINE

## 2020-05-24 PROCEDURE — 2500000003 HC RX 250 WO HCPCS: Performed by: INTERNAL MEDICINE

## 2020-05-24 PROCEDURE — 2580000003 HC RX 258: Performed by: INTERNAL MEDICINE

## 2020-05-24 RX ORDER — METOPROLOL SUCCINATE 50 MG/1
50 TABLET, EXTENDED RELEASE ORAL 2 TIMES DAILY
Status: DISCONTINUED | OUTPATIENT
Start: 2020-05-24 | End: 2020-05-25

## 2020-05-24 RX ORDER — METOPROLOL TARTRATE 5 MG/5ML
2.5 INJECTION INTRAVENOUS
Status: COMPLETED | OUTPATIENT
Start: 2020-05-24 | End: 2020-05-24

## 2020-05-24 RX ADMIN — FLECAINIDE ACETATE 100 MG: 100 TABLET ORAL at 21:26

## 2020-05-24 RX ADMIN — METOPROLOL TARTRATE 2.5 MG: 5 INJECTION INTRAVENOUS at 21:25

## 2020-05-24 RX ADMIN — QUETIAPINE FUMARATE 50 MG: 25 TABLET ORAL at 18:00

## 2020-05-24 RX ADMIN — RIVASTIGMINE TARTRATE 1.5 MG: 1.5 CAPSULE ORAL at 12:29

## 2020-05-24 RX ADMIN — SERTRALINE HYDROCHLORIDE 25 MG: 25 TABLET ORAL at 08:46

## 2020-05-24 RX ADMIN — Medication 10 ML: at 08:47

## 2020-05-24 RX ADMIN — APIXABAN 2.5 MG: 2.5 TABLET, FILM COATED ORAL at 21:26

## 2020-05-24 RX ADMIN — BRIMONIDINE TARTRATE 1 DROP: 2 SOLUTION OPHTHALMIC at 21:27

## 2020-05-24 RX ADMIN — METOPROLOL SUCCINATE 50 MG: 50 TABLET, EXTENDED RELEASE ORAL at 18:00

## 2020-05-24 RX ADMIN — APIXABAN 2.5 MG: 2.5 TABLET, FILM COATED ORAL at 08:46

## 2020-05-24 RX ADMIN — PREDNISONE 2.5 MG: 5 TABLET ORAL at 08:45

## 2020-05-24 RX ADMIN — RIVASTIGMINE TARTRATE 1.5 MG: 1.5 CAPSULE ORAL at 21:26

## 2020-05-24 RX ADMIN — BUDESONIDE 3 MG: 3 CAPSULE, GELATIN COATED ORAL at 08:47

## 2020-05-24 RX ADMIN — Medication 10 ML: at 21:26

## 2020-05-24 RX ADMIN — FLECAINIDE ACETATE 100 MG: 100 TABLET ORAL at 08:46

## 2020-05-24 RX ADMIN — METOPROLOL SUCCINATE 25 MG: 25 TABLET, FILM COATED, EXTENDED RELEASE ORAL at 08:46

## 2020-05-24 RX ADMIN — POTASSIUM CHLORIDE 8 MEQ: 600 TABLET, FILM COATED, EXTENDED RELEASE ORAL at 08:49

## 2020-05-24 RX ADMIN — BRIMONIDINE TARTRATE 1 DROP: 2 SOLUTION OPHTHALMIC at 08:47

## 2020-05-24 ASSESSMENT — PAIN SCALES - PAIN ASSESSMENT IN ADVANCED DEMENTIA (PAINAD)
NEGVOCALIZATION: 0
CONSOLABILITY: 0
CONSOLABILITY: 0
FACIALEXPRESSION: 0
BREATHING: 0
BODYLANGUAGE: 0
TOTALSCORE: 0
NEGVOCALIZATION: 0
FACIALEXPRESSION: 0
BREATHING: 0
FACIALEXPRESSION: 0
BODYLANGUAGE: 0
CONSOLABILITY: 0
TOTALSCORE: 0
BREATHING: 0
BODYLANGUAGE: 0
TOTALSCORE: 0
NEGVOCALIZATION: 0

## 2020-05-24 ASSESSMENT — PAIN SCALES - GENERAL: PAINLEVEL_OUTOF10: 0

## 2020-05-25 PROCEDURE — 6370000000 HC RX 637 (ALT 250 FOR IP): Performed by: INTERNAL MEDICINE

## 2020-05-25 PROCEDURE — 99233 SBSQ HOSP IP/OBS HIGH 50: CPT | Performed by: INTERNAL MEDICINE

## 2020-05-25 PROCEDURE — 2580000003 HC RX 258: Performed by: INTERNAL MEDICINE

## 2020-05-25 PROCEDURE — 1200000000 HC SEMI PRIVATE

## 2020-05-25 PROCEDURE — 6360000002 HC RX W HCPCS: Performed by: INTERNAL MEDICINE

## 2020-05-25 RX ORDER — METOPROLOL SUCCINATE 100 MG/1
100 TABLET, EXTENDED RELEASE ORAL 2 TIMES DAILY
Status: DISCONTINUED | OUTPATIENT
Start: 2020-05-25 | End: 2020-05-26

## 2020-05-25 RX ORDER — FUROSEMIDE 10 MG/ML
20 INJECTION INTRAMUSCULAR; INTRAVENOUS 2 TIMES DAILY
Status: COMPLETED | OUTPATIENT
Start: 2020-05-25 | End: 2020-05-26

## 2020-05-25 RX ORDER — DIGOXIN 125 MCG
125 TABLET ORAL DAILY
Status: DISCONTINUED | OUTPATIENT
Start: 2020-05-25 | End: 2020-05-28 | Stop reason: HOSPADM

## 2020-05-25 RX ADMIN — LINACLOTIDE 290 MCG: 145 CAPSULE, GELATIN COATED ORAL at 06:33

## 2020-05-25 RX ADMIN — Medication 10 ML: at 18:10

## 2020-05-25 RX ADMIN — APIXABAN 2.5 MG: 2.5 TABLET, FILM COATED ORAL at 21:10

## 2020-05-25 RX ADMIN — FLECAINIDE ACETATE 100 MG: 100 TABLET ORAL at 09:35

## 2020-05-25 RX ADMIN — METOPROLOL SUCCINATE 50 MG: 50 TABLET, EXTENDED RELEASE ORAL at 09:35

## 2020-05-25 RX ADMIN — BUDESONIDE 3 MG: 3 CAPSULE, GELATIN COATED ORAL at 09:42

## 2020-05-25 RX ADMIN — Medication 10 ML: at 21:10

## 2020-05-25 RX ADMIN — FUROSEMIDE 20 MG: 10 INJECTION, SOLUTION INTRAMUSCULAR; INTRAVENOUS at 14:34

## 2020-05-25 RX ADMIN — PREDNISONE 2.5 MG: 5 TABLET ORAL at 09:35

## 2020-05-25 RX ADMIN — Medication 10 ML: at 13:18

## 2020-05-25 RX ADMIN — Medication 10 ML: at 14:35

## 2020-05-25 RX ADMIN — FLECAINIDE ACETATE 100 MG: 100 TABLET ORAL at 21:10

## 2020-05-25 RX ADMIN — DIGOXIN 125 MCG: 125 TABLET ORAL at 14:34

## 2020-05-25 RX ADMIN — BRIMONIDINE TARTRATE 1 DROP: 2 SOLUTION OPHTHALMIC at 09:36

## 2020-05-25 RX ADMIN — Medication 10 ML: at 09:39

## 2020-05-25 RX ADMIN — SERTRALINE HYDROCHLORIDE 25 MG: 25 TABLET ORAL at 09:36

## 2020-05-25 RX ADMIN — DOCUSATE SODIUM 100 MG: 100 CAPSULE, LIQUID FILLED ORAL at 09:35

## 2020-05-25 RX ADMIN — APIXABAN 2.5 MG: 2.5 TABLET, FILM COATED ORAL at 09:35

## 2020-05-25 RX ADMIN — FUROSEMIDE 20 MG: 10 INJECTION, SOLUTION INTRAMUSCULAR; INTRAVENOUS at 18:09

## 2020-05-25 RX ADMIN — RIVASTIGMINE TARTRATE 1.5 MG: 1.5 CAPSULE ORAL at 09:42

## 2020-05-25 RX ADMIN — BRIMONIDINE TARTRATE 1 DROP: 2 SOLUTION OPHTHALMIC at 21:10

## 2020-05-25 RX ADMIN — METOPROLOL SUCCINATE 100 MG: 100 TABLET, EXTENDED RELEASE ORAL at 18:08

## 2020-05-25 RX ADMIN — RIVASTIGMINE TARTRATE 1.5 MG: 1.5 CAPSULE ORAL at 21:10

## 2020-05-25 RX ADMIN — QUETIAPINE FUMARATE 50 MG: 25 TABLET ORAL at 18:09

## 2020-05-25 ASSESSMENT — PAIN SCALES - PAIN ASSESSMENT IN ADVANCED DEMENTIA (PAINAD)
BODYLANGUAGE: 0
BODYLANGUAGE: 0
BREATHING: 0
FACIALEXPRESSION: 0
BODYLANGUAGE: 0
CONSOLABILITY: 0
BREATHING: 0
FACIALEXPRESSION: 0
FACIALEXPRESSION: 0
TOTALSCORE: 0
BODYLANGUAGE: 0
CONSOLABILITY: 0
BREATHING: 0
TOTALSCORE: 0
NEGVOCALIZATION: 0
BREATHING: 0
TOTALSCORE: 0
NEGVOCALIZATION: 0
FACIALEXPRESSION: 0
CONSOLABILITY: 0
TOTALSCORE: 0
CONSOLABILITY: 0

## 2020-05-25 ASSESSMENT — PAIN SCALES - GENERAL
PAINLEVEL_OUTOF10: 0

## 2020-05-25 NOTE — PLAN OF CARE
Problem: Falls - Risk of:  Goal: Will remain free from falls  Description: Will remain free from falls  5/25/2020 0926 by Patricia Guerin RN  Outcome: Ongoing  Note: Fall precautions are in place. Call light and frequently used items are within pt's reach. JULI/Sys monitor is in use. Frequent checks are made. Bed alarm is on. Bed is in the lowest position with side rails up X 2/4. Reoriented pt to place, time and situation with interactions. Problem: Falls - Risk of:  Goal: Absence of physical injury  Description: Absence of physical injury  Outcome: Ongoing  Note: No falls or injuries occurred. Will continue with current plan of care for now. Problem: Breathing Pattern - Ineffective:  Goal: Ability to achieve and maintain a regular respiratory rate will improve  Description: Ability to achieve and maintain a regular respiratory rate will improve  5/25/2020 0926 by Patricia Guerin RN  Outcome: Ongoing  5/25/2020 0629 by Demi Berry RN  Outcome: Ongoing     Problem: Infection:  Goal: Will remain free from infection  Description: Will remain free from infection  Outcome: Ongoing  Note: Monitor labs and temperature for signs or symptoms of infection. Assess open areas for signs of infection. Problem: Safety:  Goal: Free from accidental physical injury  Description: Free from accidental physical injury  5/25/2020 0926 by Patricia Guerin RN  Outcome: Ongoing  5/25/2020 0629 by Demi Berry RN  Outcome: Ongoing     Problem: Safety:  Goal: Free from intentional harm  Description: Free from intentional harm  Outcome: Ongoing     Problem: Daily Care:  Goal: Daily care needs are met  Description: Daily care needs are met  Outcome: Ongoing  Note: Staff assists with needs.       Problem: Pain:  Goal: Patient's pain/discomfort is manageable  Description: Patient's pain/discomfort is manageable  Outcome: Ongoing     Problem: Skin Integrity:  Goal: Skin integrity will stabilize  Description: Skin integrity will stabilize  5/25/2020 0926 by Monae Flores RN  Outcome: Ongoing     Problem: Confusion - Acute:  Goal: Absence of continued neurological deterioration signs and symptoms  Description: Absence of continued neurological deterioration signs and symptoms  Outcome: Ongoing     Problem: Discharge Planning:  Goal: Ability to perform activities of daily living will improve  Description: Ability to perform activities of daily living will improve  Outcome: Ongoing     Problem: Discharge Planning:  Goal: Participates in care planning  Description: Participates in care planning  Outcome: Ongoing     Problem: Injury - Risk of, Physical Injury:  Goal: Will remain free from falls  Description: Will remain free from falls  5/25/2020 0926 by Moane Flores RN  Outcome: Ongoing  Note: Fall precautions are in place. Call light and frequently used items are within pt's reach. JULI/Sys monitor is in use. Frequent checks are made. Bed alarm is on. Bed is in the lowest position with side rails up X 2/4. Reoriented pt to place, time and situation with interactions. Problem: Injury - Risk of, Physical Injury:  Goal: Will remain free from falls  Description: Will remain free from falls  5/25/2020 0926 by Monae Flores RN  Outcome: Ongoing  Note: Fall precautions are in place. Call light and frequently used items are within pt's reach. JULI/Sys monitor is in use. Frequent checks are made. Bed alarm is on. Bed is in the lowest position with side rails up X 2/4. Reoriented pt to place, time and situation with interactions. Problem: Injury - Risk of, Physical Injury:  Goal: Absence of physical injury  Description: Absence of physical injury  Outcome: Ongoing  Note: No falls or injuries occurred. Will continue with current plan of care for now.       Problem: Mood - Altered:  Goal: Mood stable  Description: Mood stable  Outcome: Ongoing     Problem: Psychomotor Activity - Altered:  Goal: Absence of

## 2020-05-25 NOTE — PROGRESS NOTES
Physical Therapy  Refused    Pt refused PT this PM.  \"I just want to go back to sleep\"  Retrieved cell phone for pt and placed lunch tray in front of her in case she wanted to eat later. Will attempt therapy 5/26 as pt permits.    Tank Nunes PT  1027

## 2020-05-25 NOTE — PLAN OF CARE
Problem: Falls - Risk of:  Goal: Will remain free from falls  Description: Will remain free from falls  Outcome: Ongoing     Problem: Breathing Pattern - Ineffective:  Goal: Ability to achieve and maintain a regular respiratory rate will improve  Description: Ability to achieve and maintain a regular respiratory rate will improve  Outcome: Ongoing     Problem: Safety:  Goal: Free from accidental physical injury  Description: Free from accidental physical injury  Outcome: Ongoing     Problem: Skin Integrity:  Goal: Skin integrity will stabilize  Description: Skin integrity will stabilize  Outcome: Ongoing     Problem: Confusion - Acute:  Goal: Mental status will be restored to baseline  Description: Mental status will be restored to baseline  Outcome: Ongoing     Problem: Injury - Risk of, Physical Injury:  Goal: Will remain free from falls  Description: Will remain free from falls  Outcome: Ongoing

## 2020-05-26 LAB
BLOOD CULTURE, ROUTINE: NORMAL
LV EF: 58 %
LVEF MODALITY: NORMAL

## 2020-05-26 PROCEDURE — 6370000000 HC RX 637 (ALT 250 FOR IP): Performed by: INTERNAL MEDICINE

## 2020-05-26 PROCEDURE — 1200000000 HC SEMI PRIVATE

## 2020-05-26 PROCEDURE — 97166 OT EVAL MOD COMPLEX 45 MIN: CPT

## 2020-05-26 PROCEDURE — 6360000002 HC RX W HCPCS: Performed by: INTERNAL MEDICINE

## 2020-05-26 PROCEDURE — 99232 SBSQ HOSP IP/OBS MODERATE 35: CPT | Performed by: NURSE PRACTITIONER

## 2020-05-26 PROCEDURE — 2580000003 HC RX 258: Performed by: INTERNAL MEDICINE

## 2020-05-26 PROCEDURE — 2060000000 HC ICU INTERMEDIATE R&B

## 2020-05-26 PROCEDURE — 97535 SELF CARE MNGMENT TRAINING: CPT

## 2020-05-26 PROCEDURE — 93306 TTE W/DOPPLER COMPLETE: CPT

## 2020-05-26 RX ORDER — 0.9 % SODIUM CHLORIDE 0.9 %
250 INTRAVENOUS SOLUTION INTRAVENOUS ONCE
Status: COMPLETED | OUTPATIENT
Start: 2020-05-26 | End: 2020-05-26

## 2020-05-26 RX ORDER — METOPROLOL SUCCINATE 50 MG/1
50 TABLET, EXTENDED RELEASE ORAL 2 TIMES DAILY
Status: DISCONTINUED | OUTPATIENT
Start: 2020-05-26 | End: 2020-05-27

## 2020-05-26 RX ADMIN — BRIMONIDINE TARTRATE 1 DROP: 2 SOLUTION OPHTHALMIC at 20:57

## 2020-05-26 RX ADMIN — SODIUM CHLORIDE: 9 INJECTION, SOLUTION INTRAVENOUS at 12:17

## 2020-05-26 RX ADMIN — PREDNISONE 2.5 MG: 5 TABLET ORAL at 10:43

## 2020-05-26 RX ADMIN — APIXABAN 2.5 MG: 2.5 TABLET, FILM COATED ORAL at 10:43

## 2020-05-26 RX ADMIN — APIXABAN 2.5 MG: 2.5 TABLET, FILM COATED ORAL at 20:56

## 2020-05-26 RX ADMIN — FLECAINIDE ACETATE 100 MG: 100 TABLET ORAL at 10:43

## 2020-05-26 RX ADMIN — SERTRALINE HYDROCHLORIDE 25 MG: 25 TABLET ORAL at 10:43

## 2020-05-26 RX ADMIN — BUDESONIDE 3 MG: 3 CAPSULE, GELATIN COATED ORAL at 10:46

## 2020-05-26 RX ADMIN — BRIMONIDINE TARTRATE 1 DROP: 2 SOLUTION OPHTHALMIC at 10:44

## 2020-05-26 RX ADMIN — FUROSEMIDE 20 MG: 10 INJECTION, SOLUTION INTRAMUSCULAR; INTRAVENOUS at 10:43

## 2020-05-26 RX ADMIN — Medication 10 ML: at 20:56

## 2020-05-26 RX ADMIN — RIVASTIGMINE TARTRATE 1.5 MG: 1.5 CAPSULE ORAL at 10:46

## 2020-05-26 RX ADMIN — RIVASTIGMINE TARTRATE 1.5 MG: 1.5 CAPSULE ORAL at 20:57

## 2020-05-26 RX ADMIN — DIGOXIN 125 MCG: 125 TABLET ORAL at 10:44

## 2020-05-26 RX ADMIN — ONDANSETRON 4 MG: 2 INJECTION INTRAMUSCULAR; INTRAVENOUS at 11:52

## 2020-05-26 RX ADMIN — QUETIAPINE FUMARATE 50 MG: 25 TABLET ORAL at 17:49

## 2020-05-26 RX ADMIN — METOPROLOL SUCCINATE 100 MG: 100 TABLET, EXTENDED RELEASE ORAL at 10:43

## 2020-05-26 RX ADMIN — Medication 10 ML: at 10:44

## 2020-05-26 ASSESSMENT — PAIN SCALES - PAIN ASSESSMENT IN ADVANCED DEMENTIA (PAINAD)
BREATHING: 0
TOTALSCORE: 0
BODYLANGUAGE: 0
FACIALEXPRESSION: 0
CONSOLABILITY: 0
NEGVOCALIZATION: 0

## 2020-05-26 NOTE — CARE COORDINATION
CM following for discharge planning. Pt is from LTC at [facility] and can return at discharge,  precert needed. COVID-19 negative. Pt on room air. Per Dr Jayro Carrasco note, Echo results pending and titrating metoprolol. Anticipate d/c 24-48 hours. CM spoke with Courtney Mixon at [facility] and precert was started today. Pt will also need transport at discharge.     Venita Stern RN, BSN, [facility] Cao  Case Management Department  [phone]

## 2020-05-26 NOTE — PROGRESS NOTES
Hospitalist Progress Note      PCP: Twin Boles MD    Date of Admission: 5/22/2020    Chief Complaint: SOB, lethargy    Hospital Course: The patient is a 28-year-old female with past medical history of dementia, atrial fibrillation, hypertension presented to the hospital from a nursing facility due to shortness of breath and lethargy. Per ER documentation patient noted to have increase work of breathing and lethargic for over 24 hours pulse ox in low 70 requiring oxygen   On arrival to the hospital patient was noted to be atrial fibrillation with RVR. Her proBNP was elevated greater than 8000. Subjective: Seen and examined today Alert to self only. She was hypotensive on my evaluation. Appears to be not in any distress. Breathing comfortably. Review of systems limited due to dementia. Heart rate is still in low 100s.         Medications:  Reviewed    Infusion Medications   Scheduled Medications    sodium chloride  250 mL Intravenous Once    metoprolol succinate  100 mg Oral BID    digoxin  125 mcg Oral Daily    linaclotide  290 mcg Oral QAM AC    sodium chloride flush  10 mL Intravenous 2 times per day    apixaban  2.5 mg Oral BID    brimonidine  1 drop Both Eyes BID    budesonide  3 mg Oral QAM    docusate sodium  100 mg Oral QAM    flecainide  100 mg Oral BID    rivastigmine  1.5 mg Oral BID    QUEtiapine  50 mg Oral QPM    predniSONE  2.5 mg Oral Daily    sertraline  25 mg Oral Daily     PRN Meds: perflutren lipid microspheres, labetalol, sodium chloride flush, acetaminophen **OR** acetaminophen, polyethylene glycol, promethazine **OR** ondansetron, HYDROcodone-acetaminophen, ipratropium-albuterol      Intake/Output Summary (Last 24 hours) at 5/26/2020 1303  Last data filed at 5/25/2020 1459  Gross per 24 hour   Intake 180 ml   Output 300 ml   Net -120 ml       Physical Exam Performed:    BP (!) 82/59   Pulse 99   Temp 97 °F (36.1 °C) (Axillary)   Resp 18   Ht 4' 10\"

## 2020-05-26 NOTE — PROGRESS NOTES
Occupational Therapy   Occupational Therapy Initial Assessment  Date: 2020   Patient Name: Hayden Sal  MRN: [id number]     : 10/18/1925    Date of Service: 2020    Discharge Recommendations:    Jacqueline Quesada scored a 13/24 on the AM-PAC ADL Inpatient form. Current research shows that an AM-PAC score of 17 or less is typically not associated with a discharge to the patient's home setting. Based on the patient's AM-PAC score and their current ADL deficits, it is recommended that the patient have 3-5 sessions per week of Occupational Therapy at d/c to increase the patient's independence. At this time, this patient lacks the endurance, and/or tolerance for 3 hours of therapy/day, 5-7x/wk and would benefit most from a follow up treatment frequency of 3-5x/wk. Please see assessment section for further patient specific details. If patient discharges prior to next session this note will serve as a discharge summary. Please see below for the latest assessment towards goals. 3-5 sessions per week  OT Equipment Recommendations  Other: defer to next level of care    Assessment   Performance deficits / Impairments: Decreased functional mobility ; Decreased balance;Decreased coordination;Decreased ADL status; Decreased cognition;Decreased ROM; Decreased endurance;Decreased strength      Assessment: Pt tolerated OT evaluation well. Pt performing ADLs with Max A. Pt requiring Max A to Min A with functional transfers and mobility up to 2 feet. At baseline pt ambulates Mod I using 4WW. Pt able to dress, toilet, feed herself without assistance at baseline. Pt is not at baseline level of occupational performance. Recommend continued OT services to increase safety and independence with ADLs and functional transfers. Prognosis: Good  Decision Making: Medium Complexity      OT Education: OT Role;Plan of Care;Transfer Training;IADL Safety; ADL Adaptive Strategies;Orientation  Patient Education: home

## 2020-05-26 NOTE — PLAN OF CARE
Problem: Injury - Risk of, Physical Injury:  Goal: Will remain free from falls  Description: Will remain free from falls  Outcome: Ongoing  Note: Patient calm, fall precautions in place. Will continue to monitor. Problem: Infection:  Goal: Will remain free from infection  Description: Will remain free from infection  Outcome: Ongoing  Note: No signs or symptoms of infections noted at this time. Will continue to monitor.

## 2020-05-26 NOTE — PROGRESS NOTES
fraction was in the range of 55% to 60%. Wall motion was normal;    there were no regional wall motion abnormalities. Features are    consistent with a pseudonormal left ventricular filling pattern,    with concomitant abnormal relaxation and increased filling    pressure (grade 2 diastolic dysfunction). - Aortic valve: Mean gradient (S): 5mm Hg.  - Mitral valve: Mild focal thickening and calcification of the    posterior leaflet. There was mild to moderate regurgitation.  - Left atrium: The atrium was markedly dilated. - Right atrium: The atrium was moderately dilated. - Tricuspid valve: There was moderate regurgitation.  - Inferior vena cava: The vessel was dilated; the respirophasic    diameter changes were in the normal range (>= 50%); findings are    consistent with mildly elevated central venous pressure. - Pulmonary arteries: PA peak pressure: 76mm Hg (S). Assessment:  80 y. o. with dementia, A fib, HTN who presented with SOB and lethargy.    Issues:  -A fib RVR  -HTN  -Dementia   -Acute dCHF    Plan:  -Keep K>4, Mg>2.  -Echo pending  -Eliquis  -Toprol  mg po bid   -Flecainide   -Digoxin  -Lasix 20 mg IV BID

## 2020-05-26 NOTE — FLOWSHEET NOTE
05/26/20 1218   Vitals   Pulse 111   BP (!) 78/59     Dr. Joleen Light at bedside. 250 ML Normal saline bolus ordered and started. Patient felt nauseated earlier and was given Zofran. Patient is alert and answering questions. She states she is tired.   Will monitor closely and will report to MD as needed

## 2020-05-27 LAB
ANION GAP SERPL CALCULATED.3IONS-SCNC: 12 MMOL/L (ref 3–16)
BUN BLDV-MCNC: 29 MG/DL (ref 7–20)
CALCIUM SERPL-MCNC: 8.2 MG/DL (ref 8.3–10.6)
CHLORIDE BLD-SCNC: 101 MMOL/L (ref 99–110)
CO2: 26 MMOL/L (ref 21–32)
CREAT SERPL-MCNC: 0.7 MG/DL (ref 0.6–1.2)
CULTURE, BLOOD 2: NORMAL
GFR AFRICAN AMERICAN: >60
GFR NON-AFRICAN AMERICAN: >60
GLUCOSE BLD-MCNC: 106 MG/DL (ref 70–99)
HCT VFR BLD CALC: 41.1 % (ref 36–48)
HEMOGLOBIN: 13.5 G/DL (ref 12–16)
MAGNESIUM: 1.9 MG/DL (ref 1.8–2.4)
MCH RBC QN AUTO: 26.6 PG (ref 26–34)
MCHC RBC AUTO-ENTMCNC: 32.8 G/DL (ref 31–36)
MCV RBC AUTO: 81 FL (ref 80–100)
PDW BLD-RTO: 15.1 % (ref 12.4–15.4)
PLATELET # BLD: 226 K/UL (ref 135–450)
PMV BLD AUTO: 7.4 FL (ref 5–10.5)
POTASSIUM REFLEX MAGNESIUM: 3 MMOL/L (ref 3.5–5.1)
RBC # BLD: 5.07 M/UL (ref 4–5.2)
SODIUM BLD-SCNC: 139 MMOL/L (ref 136–145)
WBC # BLD: 7.5 K/UL (ref 4–11)

## 2020-05-27 PROCEDURE — 92526 ORAL FUNCTION THERAPY: CPT

## 2020-05-27 PROCEDURE — 2580000003 HC RX 258: Performed by: INTERNAL MEDICINE

## 2020-05-27 PROCEDURE — 99232 SBSQ HOSP IP/OBS MODERATE 35: CPT | Performed by: NURSE PRACTITIONER

## 2020-05-27 PROCEDURE — 6370000000 HC RX 637 (ALT 250 FOR IP): Performed by: INTERNAL MEDICINE

## 2020-05-27 PROCEDURE — 6370000000 HC RX 637 (ALT 250 FOR IP): Performed by: NURSE PRACTITIONER

## 2020-05-27 PROCEDURE — 97162 PT EVAL MOD COMPLEX 30 MIN: CPT

## 2020-05-27 PROCEDURE — 80048 BASIC METABOLIC PNL TOTAL CA: CPT

## 2020-05-27 PROCEDURE — 85027 COMPLETE CBC AUTOMATED: CPT

## 2020-05-27 PROCEDURE — 2060000000 HC ICU INTERMEDIATE R&B

## 2020-05-27 PROCEDURE — 97530 THERAPEUTIC ACTIVITIES: CPT

## 2020-05-27 PROCEDURE — 83735 ASSAY OF MAGNESIUM: CPT

## 2020-05-27 PROCEDURE — 36415 COLL VENOUS BLD VENIPUNCTURE: CPT

## 2020-05-27 RX ORDER — METOPROLOL SUCCINATE 50 MG/1
50 TABLET, EXTENDED RELEASE ORAL 2 TIMES DAILY
Status: DISCONTINUED | OUTPATIENT
Start: 2020-05-27 | End: 2020-05-28 | Stop reason: HOSPADM

## 2020-05-27 RX ORDER — POTASSIUM CHLORIDE 20 MEQ/1
40 TABLET, EXTENDED RELEASE ORAL ONCE
Status: DISCONTINUED | OUTPATIENT
Start: 2020-05-27 | End: 2020-05-27

## 2020-05-27 RX ORDER — 0.9 % SODIUM CHLORIDE 0.9 %
500 INTRAVENOUS SOLUTION INTRAVENOUS ONCE
Status: COMPLETED | OUTPATIENT
Start: 2020-05-27 | End: 2020-05-27

## 2020-05-27 RX ORDER — POTASSIUM CHLORIDE 20 MEQ/1
40 TABLET, EXTENDED RELEASE ORAL 2 TIMES DAILY
Status: COMPLETED | OUTPATIENT
Start: 2020-05-27 | End: 2020-05-27

## 2020-05-27 RX ADMIN — BRIMONIDINE TARTRATE 1 DROP: 2 SOLUTION OPHTHALMIC at 20:36

## 2020-05-27 RX ADMIN — APIXABAN 2.5 MG: 2.5 TABLET, FILM COATED ORAL at 20:35

## 2020-05-27 RX ADMIN — QUETIAPINE FUMARATE 50 MG: 25 TABLET ORAL at 19:16

## 2020-05-27 RX ADMIN — RIVASTIGMINE TARTRATE 1.5 MG: 1.5 CAPSULE ORAL at 09:35

## 2020-05-27 RX ADMIN — LINACLOTIDE 290 MCG: 145 CAPSULE, GELATIN COATED ORAL at 05:15

## 2020-05-27 RX ADMIN — Medication 10 ML: at 09:35

## 2020-05-27 RX ADMIN — DOCUSATE SODIUM 100 MG: 100 CAPSULE, LIQUID FILLED ORAL at 09:33

## 2020-05-27 RX ADMIN — SERTRALINE HYDROCHLORIDE 25 MG: 25 TABLET ORAL at 09:34

## 2020-05-27 RX ADMIN — DIGOXIN 125 MCG: 125 TABLET ORAL at 09:34

## 2020-05-27 RX ADMIN — FLECAINIDE ACETATE 100 MG: 100 TABLET ORAL at 09:34

## 2020-05-27 RX ADMIN — RIVASTIGMINE TARTRATE 1.5 MG: 1.5 CAPSULE ORAL at 20:35

## 2020-05-27 RX ADMIN — ACETAMINOPHEN 650 MG: 325 TABLET ORAL at 05:21

## 2020-05-27 RX ADMIN — PREDNISONE 2.5 MG: 5 TABLET ORAL at 09:34

## 2020-05-27 RX ADMIN — POTASSIUM CHLORIDE 40 MEQ: 20 TABLET, EXTENDED RELEASE ORAL at 20:35

## 2020-05-27 RX ADMIN — APIXABAN 2.5 MG: 2.5 TABLET, FILM COATED ORAL at 09:33

## 2020-05-27 RX ADMIN — METOPROLOL SUCCINATE 50 MG: 50 TABLET, EXTENDED RELEASE ORAL at 20:35

## 2020-05-27 RX ADMIN — FLECAINIDE ACETATE 100 MG: 100 TABLET ORAL at 20:35

## 2020-05-27 RX ADMIN — Medication 10 ML: at 20:36

## 2020-05-27 RX ADMIN — SODIUM CHLORIDE 500 ML: 900 INJECTION, SOLUTION INTRAVENOUS at 00:30

## 2020-05-27 RX ADMIN — BRIMONIDINE TARTRATE 1 DROP: 2 SOLUTION OPHTHALMIC at 09:36

## 2020-05-27 RX ADMIN — BUDESONIDE 3 MG: 3 CAPSULE, GELATIN COATED ORAL at 09:35

## 2020-05-27 RX ADMIN — POTASSIUM CHLORIDE 40 MEQ: 20 TABLET, EXTENDED RELEASE ORAL at 09:33

## 2020-05-27 ASSESSMENT — PAIN SCALES - PAIN ASSESSMENT IN ADVANCED DEMENTIA (PAINAD)
BODYLANGUAGE: 0
TOTALSCORE: 0
FACIALEXPRESSION: 0
BREATHING: 0
BODYLANGUAGE: 0
FACIALEXPRESSION: 0
TOTALSCORE: 0
CONSOLABILITY: 0
BREATHING: 0
NEGVOCALIZATION: 0
NEGVOCALIZATION: 0
CONSOLABILITY: 0

## 2020-05-27 ASSESSMENT — PAIN SCALES - GENERAL: PAINLEVEL_OUTOF10: 3

## 2020-05-27 NOTE — PROGRESS NOTES
Called report to [facility] [address] [address]. No questions of concerns regarding patient at this time.

## 2020-05-27 NOTE — PROGRESS NOTES
Physical Therapy    Facility/Department: [address] [place] [address] Drive  Initial Assessment & Treatment    NAME: Melanie Flores  : 10/18/1925  MRN: [id number]    Date of Service: 2020    Discharge Recommendations:  Jhony Quesada scored a 14/24 on the AM-PAC short mobility form. Current research shows that an AM-PAC score of 17 or less is typically not associated with a discharge to the patient's home setting. Based on the patient's AM-PAC score and their current functional mobility deficits, it is recommended that the patient have 3-5 sessions per week of Physical Therapy at d/c to increase the patient's independence. At this time, this patient lacks the endurance, and/or tolerance for 3 hours of therapy/day, 5-7x/wk and would benefit most from a follow up treatment frequency of 3-5x/wk. Please see assessment section for further patient specific details. If patient discharges prior to next session this note will serve as a discharge summary. Please see below for the latest assessment towards goals. PT Equipment Recommendations  Equipment Needed: (defer to facility to obtain )    Assessment   Body structures, Functions, Activity limitations: Decreased functional mobility ; Decreased posture;Decreased endurance;Decreased strength;Decreased balance;Decreased safe awareness  Assessment: Pt was agreeable to PT assessment today & tolerated with no adverse events. Webster Minus requires one person assist to complete basic functional txfs & demo's weakness of the BLEs & fatigues quickly with activity. Pt will continue to benefit from skilled PT services while in house to address these deficits & from additional PT services upon medical clearance.    Treatment Diagnosis: Weakness & impaired mobility   Prognosis: Good  Decision Making: Medium Complexity  PT Education: PT Role;General Safety;Orientation;Transfer Training;Functional Mobility Training  Patient Education: Pt v.u & will benefit from additional

## 2020-05-27 NOTE — CARE COORDINATION
SW following Pt today and spoke with Janis/Myrna at [facility] to verify why [employer] needed for return to long term care. Roxanna Andradee stated they are precerting Pt to go to skilled level of care at [facility] and they are now \"clarifying if all insurances need precert. \" Roxanna Gonzalez stated she should have precert back by tomorrow and if not and if Pt ready for DC, Pt can come back to long term care and she can continue skilled Precert there.  ANNETTA/LAWRENCE will follow and plan DC to [facility] in AM.    Prabha Segovia, [place]  Case Management  [phone]

## 2020-05-27 NOTE — PLAN OF CARE
Problem: Falls - Risk of:  Goal: Will remain free from falls  Description: Will remain free from falls  5/27/2020 0205 by Roxy Whatley RN  Outcome: Ongoing  Note: Patient is a fall risk. See Fall Risk assessment for details. Bed is in low, lock position; call light/belongings within reach. No attempts to get out of bed have been made, calls appropriately when assistance is needed. Bed alarm and hourly rounds in place for safety. Will continue to monitor and reassess throughout shift. Problem: Falls - Risk of:  Goal: Absence of physical injury  Description: Absence of physical injury  Outcome: Ongoing     Problem: Skin Integrity:  Goal: Skin integrity will stabilize  Description: Skin integrity will stabilize  5/27/2020 0205 by Roxy Whatley RN  Note: Pt at risk for skin breakdown. Skin assessment as documented. Pts skin cleansed with inc cleanser as needed. Pt repositioned in bed with pillow support as needed. Call light within reach. Will continue to monitor. Problem: Injury - Risk of, Physical Injury:  Goal: Will remain free from falls  Description: Will remain free from falls  5/27/2020 0205 by Roxy Whatley RN  Outcome: Ongoing  Note: Patient is a fall risk. See Fall Risk assessment for details. Bed is in low, lock position; call light/belongings within reach. No attempts to get out of bed have been made, calls appropriately when assistance is needed. Bed alarm and hourly rounds in place for safety. Will continue to monitor and reassess throughout shift.         Problem: Injury - Risk of, Physical Injury:  Goal: Absence of physical injury  Description: Absence of physical injury  Outcome: Ongoing

## 2020-05-27 NOTE — PROGRESS NOTES
Hospitalist Progress Note      PCP: Greg Jean Baptiste MD    Date of Admission: 5/22/2020    Chief Complaint: SOB, lethargy    Hospital Course: The patient is a 80-year-old female with past medical history of dementia, atrial fibrillation, hypertension presented to the hospital from a nursing facility due to shortness of breath and lethargy. Per ER documentation patient noted to have increase work of breathing and lethargic for over 24 hours pulse ox in low 70 requiring oxygen   On arrival to the hospital patient was noted to be atrial fibrillation with RVR. Her proBNP was elevated greater than 8000. Subjective: Seen and examined today AOx2 self and place. Patient denies any shortness of breath, nausea, vomiting, chest pain, lightheadedness, dizziness. No acute event reported overnight.           Medications:  Reviewed    Infusion Medications   Scheduled Medications    potassium chloride  40 mEq Oral BID    metoprolol succinate  50 mg Oral BID    digoxin  125 mcg Oral Daily    linaclotide  290 mcg Oral QAM AC    sodium chloride flush  10 mL Intravenous 2 times per day    apixaban  2.5 mg Oral BID    brimonidine  1 drop Both Eyes BID    budesonide  3 mg Oral QAM    docusate sodium  100 mg Oral QAM    flecainide  100 mg Oral BID    rivastigmine  1.5 mg Oral BID    QUEtiapine  50 mg Oral QPM    predniSONE  2.5 mg Oral Daily    sertraline  25 mg Oral Daily     PRN Meds: perflutren lipid microspheres, labetalol, sodium chloride flush, acetaminophen **OR** acetaminophen, polyethylene glycol, promethazine **OR** ondansetron, HYDROcodone-acetaminophen, ipratropium-albuterol      Intake/Output Summary (Last 24 hours) at 5/27/2020 1312  Last data filed at 5/27/2020 0924  Gross per 24 hour   Intake 550 ml   Output 450 ml   Net 100 ml       Physical Exam Performed:    BP 99/70   Pulse 97   Temp 98.3 °F (36.8 °C) (Axillary)   Resp 16   Ht 4' 10\" (1.473 m)   Wt 119 lb 7.8 oz (54.2 kg)   SpO2 96% [I50.31]     Atrial fibrillation with rapid ventricular response (HCC) [I48.91]     Acute respiratory failure ([facility] [address] [J96.00] 05/22/2020       # Possible SIRS with Acute hypoxic respiratory failure resolved    #A. fib with RVR rate controlled  Continue flecainide, continue digoxin. Metoprolol was changed to 50 mg twice daily with parameters. Anticoagulation with Eliquis. Echo EF 55 to 60%. Indeterminant diastolic dysfunction. Mild to moderate TR. No wall motion abnormality. #hypotension     #Elevated BNP concern for heart failure  Pending echo did receive a dose of Lasix. 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 Continue rivastagmine, Seroquel. Patient is high risk for hospital related delirium. Avoid benzos/sedatives. Continue reorientation. DVT Prophylaxis: Eliquis  Diet: DIET DENTAL SOFT;  Code Status: DNR-CCA    PT/OT Eval Status: in progress    Dispo - pending echo, titrate metoprolol. Likely d/c in 24-48 hours.      This chart was likely completed using voice recognition technology and may contain unintended grammatical , phraseology,and/or punctuation errors      Reena Hudson MD

## 2020-05-28 VITALS
RESPIRATION RATE: 16 BRPM | OXYGEN SATURATION: 92 % | SYSTOLIC BLOOD PRESSURE: 129 MMHG | HEIGHT: 58 IN | BODY MASS INDEX: 26.15 KG/M2 | WEIGHT: 124.56 LBS | TEMPERATURE: 97.8 F | DIASTOLIC BLOOD PRESSURE: 86 MMHG | HEART RATE: 83 BPM

## 2020-05-28 PROCEDURE — 6370000000 HC RX 637 (ALT 250 FOR IP): Performed by: INTERNAL MEDICINE

## 2020-05-28 PROCEDURE — 2580000003 HC RX 258: Performed by: INTERNAL MEDICINE

## 2020-05-28 RX ORDER — ACETAMINOPHEN AND CODEINE PHOSPHATE 300; 30 MG/1; MG/1
1 TABLET ORAL EVERY 6 HOURS PRN
Qty: 16 TABLET | Refills: 0 | Status: SHIPPED | OUTPATIENT
Start: 2020-05-28 | End: 2020-05-31

## 2020-05-28 RX ORDER — POTASSIUM CHLORIDE 20 MEQ/1
40 TABLET, EXTENDED RELEASE ORAL ONCE
Status: COMPLETED | OUTPATIENT
Start: 2020-05-28 | End: 2020-05-28

## 2020-05-28 RX ORDER — METOPROLOL SUCCINATE 50 MG/1
50 TABLET, EXTENDED RELEASE ORAL 2 TIMES DAILY
Qty: 60 TABLET | Refills: 0 | Status: SHIPPED | OUTPATIENT
Start: 2020-05-28

## 2020-05-28 RX ORDER — DIGOXIN 125 MCG
125 TABLET ORAL DAILY
Qty: 30 TABLET | Refills: 0 | Status: SHIPPED | OUTPATIENT
Start: 2020-05-29

## 2020-05-28 RX ADMIN — SERTRALINE HYDROCHLORIDE 25 MG: 25 TABLET ORAL at 09:42

## 2020-05-28 RX ADMIN — FLECAINIDE ACETATE 100 MG: 100 TABLET ORAL at 09:43

## 2020-05-28 RX ADMIN — METOPROLOL SUCCINATE 50 MG: 50 TABLET, EXTENDED RELEASE ORAL at 09:42

## 2020-05-28 RX ADMIN — BUDESONIDE 3 MG: 3 CAPSULE, GELATIN COATED ORAL at 09:44

## 2020-05-28 RX ADMIN — APIXABAN 2.5 MG: 2.5 TABLET, FILM COATED ORAL at 09:42

## 2020-05-28 RX ADMIN — BRIMONIDINE TARTRATE 1 DROP: 2 SOLUTION OPHTHALMIC at 09:45

## 2020-05-28 RX ADMIN — LINACLOTIDE 290 MCG: 145 CAPSULE, GELATIN COATED ORAL at 06:30

## 2020-05-28 RX ADMIN — RIVASTIGMINE TARTRATE 1.5 MG: 1.5 CAPSULE ORAL at 09:44

## 2020-05-28 RX ADMIN — PREDNISONE 2.5 MG: 5 TABLET ORAL at 09:42

## 2020-05-28 RX ADMIN — DIGOXIN 125 MCG: 125 TABLET ORAL at 09:42

## 2020-05-28 RX ADMIN — Medication 10 ML: at 09:43

## 2020-05-28 RX ADMIN — POTASSIUM CHLORIDE 40 MEQ: 20 TABLET, EXTENDED RELEASE ORAL at 11:50

## 2020-05-28 ASSESSMENT — PAIN SCALES - PAIN ASSESSMENT IN ADVANCED DEMENTIA (PAINAD)
BODYLANGUAGE: 0
NEGVOCALIZATION: 0
BREATHING: 0
CONSOLABILITY: 0
FACIALEXPRESSION: 0
TOTALSCORE: 0

## 2020-05-28 NOTE — PLAN OF CARE
Patient has a discharge order. RN called report to nurse Michael, at [employer]. RN answered all questions and provided return phone number for any questions. Transfer envelope sent with patient, including prescription for Tylenol #3. RN removed PIV. Site WNL and patient tolerated well. RN removed telemetry. Patient discharged by stretcher per [facility].

## 2020-05-28 NOTE — PLAN OF CARE
Problem: Breathing Pattern - Ineffective:  Goal: Ability to achieve and maintain a regular respiratory rate will improve  Description: Ability to achieve and maintain a regular respiratory rate will improve  5/28/2020 0309 by Ty Hernandez RN  Outcome: Ongoing  Note: Pt on RA, sats within %. Denies SOB. Resting comfortably. Lung sounds clear. Problem: Infection:  Goal: Will remain free from infection  Description: Will remain free from infection  Outcome: Ongoing  Note: VSS. Afebrile overnight. No acute changes on assessment. Will cont to monitor      Problem: Safety:  Goal: Free from accidental physical injury  Description: Free from accidental physical injury  Outcome: Ongoing  Note: Bed alarm remains engaged, bed in lowest position. Door remains open w frequent hourly rounding. No unsafe movements made. Will cont to monitor      Problem: Pain:  Goal: Patient's pain/discomfort is manageable  Description: Patient's pain/discomfort is manageable  Outcome: Ongoing  Note: Pt encouraged to use call light to notify staff when pain rises beyond tolerable. Pt educated on pain scale and was using call light appropriately.